# Patient Record
Sex: MALE | Race: WHITE | NOT HISPANIC OR LATINO | Employment: OTHER | URBAN - METROPOLITAN AREA
[De-identification: names, ages, dates, MRNs, and addresses within clinical notes are randomized per-mention and may not be internally consistent; named-entity substitution may affect disease eponyms.]

---

## 2017-02-14 ENCOUNTER — PATIENT MESSAGE (OUTPATIENT)
Dept: OTOLARYNGOLOGY | Facility: CLINIC | Age: 67
End: 2017-02-14

## 2017-03-06 ENCOUNTER — OFFICE VISIT (OUTPATIENT)
Dept: OTOLARYNGOLOGY | Facility: CLINIC | Age: 67
End: 2017-03-06
Payer: MEDICARE

## 2017-03-06 VITALS
WEIGHT: 178 LBS | HEART RATE: 68 BPM | SYSTOLIC BLOOD PRESSURE: 155 MMHG | DIASTOLIC BLOOD PRESSURE: 93 MMHG | BODY MASS INDEX: 24.83 KG/M2

## 2017-03-06 DIAGNOSIS — J32.9 CHRONIC RECURRENT SINUSITIS: Primary | ICD-10-CM

## 2017-03-06 DIAGNOSIS — J34.89 NASAL OBSTRUCTION: ICD-10-CM

## 2017-03-06 DIAGNOSIS — J34.2 NASAL SEPTAL DEVIATION: ICD-10-CM

## 2017-03-06 DIAGNOSIS — J34.3 NASAL TURBINATE HYPERTROPHY: ICD-10-CM

## 2017-03-06 PROCEDURE — 99213 OFFICE O/P EST LOW 20 MIN: CPT | Mod: PBBFAC | Performed by: OTOLARYNGOLOGY

## 2017-03-06 PROCEDURE — 99204 OFFICE O/P NEW MOD 45 MIN: CPT | Mod: S$PBB,,, | Performed by: OTOLARYNGOLOGY

## 2017-03-06 PROCEDURE — 99999 PR PBB SHADOW E&M-EST. PATIENT-LVL III: CPT | Mod: PBBFAC,,, | Performed by: OTOLARYNGOLOGY

## 2017-03-06 NOTE — PROGRESS NOTES
"Subjective:       Patient ID: Sunil Ashley is a 66 y.o. male.    Chief Complaint: Consult (Patient here today with c/o nasal congestion, obstruction, post-nasal drip and facial pain/pressure. Patientt akes OTC meds occasionally or sees his PCP for antibitoics.)    HPI     65 y/o presents with a 20 yr h/o allergy/sinus symptoms that have worsened in past year as well as a h/o a broken nose that occurred when he was in high school. He states that he has sinus infections 4-5 times/year with relief from from Flonase nasal spray and OTC antihistamines with some relief. He presents with postnasal drip, itchy watery eyes, facial pressure, and nasal congestion.     NOSE=35    Review of Systems   Constitutional: Negative for activity change, fatigue, fever and unexpected weight change.   HENT: Positive for congestion, postnasal drip and sinus pressure. Negative for dental problem, ear discharge, ear pain, facial swelling, hearing loss, mouth sores, nosebleeds, rhinorrhea, sneezing, sore throat, tinnitus, trouble swallowing and voice change.         C/o facial pressure.   Eyes: Positive for discharge and itching. Negative for pain and visual disturbance.        C/o "itchy watery eyes."   Respiratory: Negative for cough, choking, chest tightness, shortness of breath, wheezing and stridor.    Cardiovascular: Negative for chest pain.   Gastrointestinal: Negative for nausea and vomiting.   Musculoskeletal: Negative for gait problem, neck pain and neck stiffness.   Skin: Negative for color change, rash and wound.   Allergic/Immunologic: Negative for environmental allergies.   Neurological: Negative for dizziness, seizures, syncope, facial asymmetry, speech difficulty, weakness, light-headedness, numbness and headaches.   Psychiatric/Behavioral: Negative for agitation, confusion and decreased concentration. The patient is not nervous/anxious.        Objective:        Constitutional:   Vital signs are normal. He appears " well-developed and well-nourished. Normal speech.      Head:  Normocephalic.     Ears:  Hearing normal to normal and whispered voice; external ear normal without scars, lesions, or masses; ear canal, tympanic membrane, and middle ear normal..     Nose:  Septal deviation present. No rhinorrhea or sinus tenderness. Turbinate hypertrophy.  Right sinus exhibits no maxillary sinus tenderness and no frontal sinus tenderness. Left sinus exhibits no maxillary sinus tenderness and no frontal sinus tenderness.         80% nasal obstruction noted.    Neck:  Neck normal without thyromegaly masses, asymmetry, normal tracheal structure, crepitus, and tenderness, thyroid normal, trachea normal, phonation normal and full range of motion with neck supple.     Pulmonary/Chest:   Effort normal.     Psychiatric:   He has a normal mood and affect. His speech is normal and behavior is normal.       Assessment:       1. Chronic recurrent sinusitis    2. Nasal septal deviation    3. Nasal turbinate hypertrophy    4. Nasal obstruction        Plan:           OTC Claritin or Zyrtec.  Consider Sinus Med Rinse.  Refer to Allergist.  Schedule CT scan of sinuses.  RTC sooner if symptoms worsen.

## 2017-03-06 NOTE — PROGRESS NOTES
I have personally taken the history and examined this patient and agree with the NP's note that's stated below.

## 2017-03-07 ENCOUNTER — HOSPITAL ENCOUNTER (OUTPATIENT)
Dept: RADIOLOGY | Facility: HOSPITAL | Age: 67
Discharge: HOME | End: 2017-03-07
Attending: OTOLARYNGOLOGY
Payer: MEDICARE

## 2017-03-07 DIAGNOSIS — J32.9 CHRONIC RECURRENT SINUSITIS: ICD-10-CM

## 2017-03-07 DIAGNOSIS — J34.2 NASAL SEPTAL DEVIATION: ICD-10-CM

## 2017-03-07 PROCEDURE — 70486 CT MAXILLOFACIAL W/O DYE: CPT | Mod: TC

## 2017-03-07 PROCEDURE — 70486 CT MAXILLOFACIAL W/O DYE: CPT | Mod: 26,,, | Performed by: RADIOLOGY

## 2017-03-08 ENCOUNTER — PATIENT MESSAGE (OUTPATIENT)
Dept: OTOLARYNGOLOGY | Facility: CLINIC | Age: 67
End: 2017-03-08

## 2017-03-20 ENCOUNTER — PATIENT MESSAGE (OUTPATIENT)
Dept: OTOLARYNGOLOGY | Facility: CLINIC | Age: 67
End: 2017-03-20

## 2017-03-29 ENCOUNTER — OFFICE VISIT (OUTPATIENT)
Dept: OTOLARYNGOLOGY | Facility: CLINIC | Age: 67
End: 2017-03-29
Payer: MEDICARE

## 2017-03-29 DIAGNOSIS — M95.0 NASAL DEFORMITY, ACQUIRED: Primary | ICD-10-CM

## 2017-03-29 DIAGNOSIS — J34.2 NASAL SEPTAL DEVIATION: ICD-10-CM

## 2017-03-29 DIAGNOSIS — J34.89 NASAL OBSTRUCTION: ICD-10-CM

## 2017-03-29 PROCEDURE — 99999 PR PBB SHADOW E&M-EST. PATIENT-LVL II: CPT | Mod: PBBFAC,,, | Performed by: OTOLARYNGOLOGY

## 2017-03-29 PROCEDURE — 99214 OFFICE O/P EST MOD 30 MIN: CPT | Mod: 57,S$PBB,, | Performed by: OTOLARYNGOLOGY

## 2017-03-29 PROCEDURE — 99212 OFFICE O/P EST SF 10 MIN: CPT | Mod: PBBFAC | Performed by: OTOLARYNGOLOGY

## 2017-03-29 NOTE — PROGRESS NOTES
The patient presents to review CT scans. These show no evidence of chronic recurrent sinus infections but do confirm his nasal deformity causing obstruction. We have discussed nasal reconstruction with caudal septal extension graft/septoplasty to correct this. I have disscussed the pros and cons, risks, benefits, alternatives, as well as the technical aspects of the planned procedure in detail. The patient voices understanding and wishes to proceed with scheduling.

## 2017-04-07 DIAGNOSIS — R44.8 FACIAL PRESSURE: ICD-10-CM

## 2017-04-07 DIAGNOSIS — J34.2 NASAL SEPTAL DEFORMITY: ICD-10-CM

## 2017-04-07 DIAGNOSIS — R09.81 NASAL CONGESTION: ICD-10-CM

## 2017-04-07 DIAGNOSIS — J34.89 NASAL OBSTRUCTION: Primary | ICD-10-CM

## 2017-04-07 DIAGNOSIS — R09.82 POST-NASAL DRIP: ICD-10-CM

## 2017-04-07 DIAGNOSIS — R51.9 FACIAL PAIN: ICD-10-CM

## 2017-04-07 DIAGNOSIS — Z01.818 PREOP TESTING: ICD-10-CM

## 2017-04-26 ENCOUNTER — HOSPITAL ENCOUNTER (OUTPATIENT)
Dept: CARDIOLOGY | Facility: CLINIC | Age: 67
Discharge: HOME | End: 2017-04-26
Payer: MEDICARE

## 2017-04-26 DIAGNOSIS — R09.82 POST-NASAL DRIP: ICD-10-CM

## 2017-04-26 DIAGNOSIS — R09.81 NASAL CONGESTION: ICD-10-CM

## 2017-04-26 DIAGNOSIS — J34.2 NASAL SEPTAL DEFORMITY: ICD-10-CM

## 2017-04-26 DIAGNOSIS — R51.9 FACIAL PAIN: ICD-10-CM

## 2017-04-26 DIAGNOSIS — J34.89 NASAL OBSTRUCTION: ICD-10-CM

## 2017-04-26 DIAGNOSIS — R44.8 FACIAL PRESSURE: ICD-10-CM

## 2017-04-26 DIAGNOSIS — Z01.818 PREOP TESTING: ICD-10-CM

## 2017-04-26 PROCEDURE — 93005 ELECTROCARDIOGRAM TRACING: CPT | Mod: PBBFAC | Performed by: INTERNAL MEDICINE

## 2017-04-26 PROCEDURE — 93010 ELECTROCARDIOGRAM REPORT: CPT | Mod: S$PBB,,, | Performed by: INTERNAL MEDICINE

## 2017-05-01 ENCOUNTER — PATIENT MESSAGE (OUTPATIENT)
Dept: OTOLARYNGOLOGY | Facility: CLINIC | Age: 67
End: 2017-05-01

## 2017-05-01 ENCOUNTER — TELEPHONE (OUTPATIENT)
Dept: OTOLARYNGOLOGY | Facility: CLINIC | Age: 67
End: 2017-05-01

## 2017-05-02 ENCOUNTER — ANESTHESIA EVENT (OUTPATIENT)
Dept: SURGERY | Facility: HOSPITAL | Age: 67
End: 2017-05-02
Payer: MEDICARE

## 2017-05-02 ENCOUNTER — HOSPITAL ENCOUNTER (OUTPATIENT)
Facility: HOSPITAL | Age: 67
Discharge: HOME | End: 2017-05-02
Attending: OTOLARYNGOLOGY | Admitting: OTOLARYNGOLOGY
Payer: MEDICARE

## 2017-05-02 ENCOUNTER — SURGERY (OUTPATIENT)
Age: 67
End: 2017-05-02

## 2017-05-02 ENCOUNTER — ANESTHESIA (OUTPATIENT)
Dept: SURGERY | Facility: HOSPITAL | Age: 67
End: 2017-05-02
Payer: MEDICARE

## 2017-05-02 DIAGNOSIS — M95.0 NASAL VALVE COLLAPSE: Primary | ICD-10-CM

## 2017-05-02 PROCEDURE — 30140 RESECT INFERIOR TURBINATE: CPT | Mod: 50,51,GC, | Performed by: OTOLARYNGOLOGY

## 2017-05-02 PROCEDURE — D9220A PRA ANESTHESIA: Mod: CRNA,,, | Performed by: NURSE ANESTHETIST, CERTIFIED REGISTERED

## 2017-05-02 PROCEDURE — 36000706: Performed by: OTOLARYNGOLOGY

## 2017-05-02 PROCEDURE — 25000003 PHARM REV CODE 250: Performed by: OTOLARYNGOLOGY

## 2017-05-02 PROCEDURE — 63600175 PHARM REV CODE 636 W HCPCS: Performed by: NURSE ANESTHETIST, CERTIFIED REGISTERED

## 2017-05-02 PROCEDURE — 37000009 HC ANESTHESIA EA ADD 15 MINS: Performed by: OTOLARYNGOLOGY

## 2017-05-02 PROCEDURE — 94761 N-INVAS EAR/PLS OXIMETRY MLT: CPT

## 2017-05-02 PROCEDURE — 71000033 HC RECOVERY, INTIAL HOUR: Performed by: OTOLARYNGOLOGY

## 2017-05-02 PROCEDURE — 20912 REMOVE CARTILAGE FOR GRAFT: CPT | Mod: 51,GC,, | Performed by: OTOLARYNGOLOGY

## 2017-05-02 PROCEDURE — 71000039 HC RECOVERY, EACH ADD'L HOUR: Performed by: OTOLARYNGOLOGY

## 2017-05-02 PROCEDURE — 36000707: Performed by: OTOLARYNGOLOGY

## 2017-05-02 PROCEDURE — 37000008 HC ANESTHESIA 1ST 15 MINUTES: Performed by: OTOLARYNGOLOGY

## 2017-05-02 PROCEDURE — 25000003 PHARM REV CODE 250: Performed by: ANESTHESIOLOGY

## 2017-05-02 PROCEDURE — 30520 REPAIR OF NASAL SEPTUM: CPT | Mod: 51,GC,, | Performed by: OTOLARYNGOLOGY

## 2017-05-02 PROCEDURE — 71000015 HC POSTOP RECOV 1ST HR: Performed by: OTOLARYNGOLOGY

## 2017-05-02 PROCEDURE — 27000221 HC OXYGEN, UP TO 24 HOURS

## 2017-05-02 PROCEDURE — D9220A PRA ANESTHESIA: Mod: ANES,,, | Performed by: ANESTHESIOLOGY

## 2017-05-02 PROCEDURE — 25000003 PHARM REV CODE 250: Performed by: NURSE ANESTHETIST, CERTIFIED REGISTERED

## 2017-05-02 PROCEDURE — 27201423 OPTIME MED/SURG SUP & DEVICES STERILE SUPPLY: Performed by: OTOLARYNGOLOGY

## 2017-05-02 PROCEDURE — 30465 REPAIR NASAL STENOSIS: CPT | Mod: GC,,, | Performed by: OTOLARYNGOLOGY

## 2017-05-02 RX ORDER — CEPHALEXIN 500 MG/1
500 CAPSULE ORAL EVERY 8 HOURS
Qty: 30 CAPSULE | Refills: 0 | Status: SHIPPED | OUTPATIENT
Start: 2017-05-02 | End: 2017-05-12

## 2017-05-02 RX ORDER — ONDANSETRON 8 MG/1
8 TABLET, ORALLY DISINTEGRATING ORAL EVERY 8 HOURS PRN
Qty: 21 TABLET | Refills: 0 | Status: SHIPPED | OUTPATIENT
Start: 2017-05-02

## 2017-05-02 RX ORDER — HYDROMORPHONE HYDROCHLORIDE 1 MG/ML
0.2 INJECTION, SOLUTION INTRAMUSCULAR; INTRAVENOUS; SUBCUTANEOUS EVERY 5 MIN PRN
Status: DISCONTINUED | OUTPATIENT
Start: 2017-05-02 | End: 2017-05-02 | Stop reason: HOSPADM

## 2017-05-02 RX ORDER — HYDROCODONE BITARTRATE AND ACETAMINOPHEN 7.5; 325 MG/1; MG/1
1 TABLET ORAL EVERY 6 HOURS PRN
Qty: 28 TABLET | Refills: 0 | Status: SHIPPED | OUTPATIENT
Start: 2017-05-02

## 2017-05-02 RX ORDER — ROCURONIUM BROMIDE 10 MG/ML
INJECTION, SOLUTION INTRAVENOUS
Status: DISCONTINUED | OUTPATIENT
Start: 2017-05-02 | End: 2017-05-02

## 2017-05-02 RX ORDER — GLYCOPYRROLATE 0.2 MG/ML
INJECTION INTRAMUSCULAR; INTRAVENOUS
Status: DISCONTINUED | OUTPATIENT
Start: 2017-05-02 | End: 2017-05-02

## 2017-05-02 RX ORDER — CEFAZOLIN SODIUM 1 G/3ML
INJECTION, POWDER, FOR SOLUTION INTRAMUSCULAR; INTRAVENOUS
Status: DISCONTINUED | OUTPATIENT
Start: 2017-05-02 | End: 2017-05-02

## 2017-05-02 RX ORDER — LIDOCAINE HYDROCHLORIDE 10 MG/ML
1 INJECTION, SOLUTION EPIDURAL; INFILTRATION; INTRACAUDAL; PERINEURAL ONCE
Status: COMPLETED | OUTPATIENT
Start: 2017-05-02 | End: 2017-05-02

## 2017-05-02 RX ORDER — NEOSTIGMINE METHYLSULFATE 1 MG/ML
INJECTION, SOLUTION INTRAVENOUS
Status: DISCONTINUED | OUTPATIENT
Start: 2017-05-02 | End: 2017-05-02

## 2017-05-02 RX ORDER — ONDANSETRON 2 MG/ML
INJECTION INTRAMUSCULAR; INTRAVENOUS
Status: DISCONTINUED | OUTPATIENT
Start: 2017-05-02 | End: 2017-05-02

## 2017-05-02 RX ORDER — PHENYLEPHRINE HYDROCHLORIDE 10 MG/ML
INJECTION INTRAVENOUS
Status: DISCONTINUED | OUTPATIENT
Start: 2017-05-02 | End: 2017-05-02

## 2017-05-02 RX ORDER — SODIUM CHLORIDE 9 MG/ML
INJECTION, SOLUTION INTRAVENOUS CONTINUOUS
Status: DISCONTINUED | OUTPATIENT
Start: 2017-05-02 | End: 2017-05-02 | Stop reason: HOSPADM

## 2017-05-02 RX ORDER — MIDAZOLAM HYDROCHLORIDE 1 MG/ML
INJECTION, SOLUTION INTRAMUSCULAR; INTRAVENOUS
Status: DISCONTINUED | OUTPATIENT
Start: 2017-05-02 | End: 2017-05-02

## 2017-05-02 RX ORDER — HYDROCODONE BITARTRATE AND ACETAMINOPHEN 7.5; 325 MG/1; MG/1
1 TABLET ORAL EVERY 6 HOURS PRN
Status: DISCONTINUED | OUTPATIENT
Start: 2017-05-02 | End: 2017-05-02 | Stop reason: HOSPADM

## 2017-05-02 RX ORDER — FENTANYL CITRATE 50 UG/ML
INJECTION, SOLUTION INTRAMUSCULAR; INTRAVENOUS
Status: DISCONTINUED | OUTPATIENT
Start: 2017-05-02 | End: 2017-05-02

## 2017-05-02 RX ORDER — DEXAMETHASONE SODIUM PHOSPHATE 4 MG/ML
INJECTION, SOLUTION INTRA-ARTICULAR; INTRALESIONAL; INTRAMUSCULAR; INTRAVENOUS; SOFT TISSUE
Status: DISCONTINUED | OUTPATIENT
Start: 2017-05-02 | End: 2017-05-02

## 2017-05-02 RX ORDER — LIDOCAINE HYDROCHLORIDE AND EPINEPHRINE 10; 10 MG/ML; UG/ML
INJECTION, SOLUTION INFILTRATION; PERINEURAL
Status: DISCONTINUED | OUTPATIENT
Start: 2017-05-02 | End: 2017-05-02 | Stop reason: HOSPADM

## 2017-05-02 RX ORDER — BUPIVACAINE HYDROCHLORIDE AND EPINEPHRINE 5; 5 MG/ML; UG/ML
INJECTION, SOLUTION EPIDURAL; INTRACAUDAL; PERINEURAL
Status: DISCONTINUED | OUTPATIENT
Start: 2017-05-02 | End: 2017-05-02 | Stop reason: HOSPADM

## 2017-05-02 RX ORDER — OXYMETAZOLINE HCL 0.05 %
SPRAY, NON-AEROSOL (ML) NASAL
Status: DISCONTINUED | OUTPATIENT
Start: 2017-05-02 | End: 2017-05-02 | Stop reason: HOSPADM

## 2017-05-02 RX ORDER — PROPOFOL 10 MG/ML
VIAL (ML) INTRAVENOUS
Status: DISCONTINUED | OUTPATIENT
Start: 2017-05-02 | End: 2017-05-02

## 2017-05-02 RX ORDER — LIDOCAINE HCL/PF 100 MG/5ML
SYRINGE (ML) INTRAVENOUS
Status: DISCONTINUED | OUTPATIENT
Start: 2017-05-02 | End: 2017-05-02

## 2017-05-02 RX ADMIN — MIDAZOLAM HYDROCHLORIDE 2 MG: 1 INJECTION, SOLUTION INTRAMUSCULAR; INTRAVENOUS at 07:05

## 2017-05-02 RX ADMIN — CEFAZOLIN 2 G: 1 INJECTION, POWDER, FOR SOLUTION INTRAVENOUS at 07:05

## 2017-05-02 RX ADMIN — ONDANSETRON 4 MG: 2 INJECTION INTRAMUSCULAR; INTRAVENOUS at 09:05

## 2017-05-02 RX ADMIN — EPHEDRINE SULFATE 10 MG: 50 INJECTION, SOLUTION INTRAMUSCULAR; INTRAVENOUS; SUBCUTANEOUS at 07:05

## 2017-05-02 RX ADMIN — PHENYLEPHRINE HYDROCHLORIDE 100 MCG: 10 INJECTION INTRAVENOUS at 08:05

## 2017-05-02 RX ADMIN — PHENYLEPHRINE HYDROCHLORIDE 100 MCG: 10 INJECTION INTRAVENOUS at 09:05

## 2017-05-02 RX ADMIN — HYDROCODONE BITARTRATE AND ACETAMINOPHEN 1 TABLET: 7.5; 325 TABLET ORAL at 10:05

## 2017-05-02 RX ADMIN — EPHEDRINE SULFATE 10 MG: 50 INJECTION, SOLUTION INTRAMUSCULAR; INTRAVENOUS; SUBCUTANEOUS at 08:05

## 2017-05-02 RX ADMIN — NEOSTIGMINE METHYLSULFATE 5 MG: 1 INJECTION INTRAVENOUS at 10:05

## 2017-05-02 RX ADMIN — LIDOCAINE HYDROCHLORIDE 10 MG: 10 INJECTION, SOLUTION EPIDURAL; INFILTRATION; INTRACAUDAL; PERINEURAL at 06:05

## 2017-05-02 RX ADMIN — BUPIVACAINE HYDROCHLORIDE AND EPINEPHRINE BITARTRATE 16.5 ML: 5; .0091 INJECTION, SOLUTION EPIDURAL; INTRACAUDAL; PERINEURAL at 08:05

## 2017-05-02 RX ADMIN — ROCURONIUM BROMIDE 50 MG: 10 INJECTION, SOLUTION INTRAVENOUS at 07:05

## 2017-05-02 RX ADMIN — FENTANYL CITRATE 100 MCG: 50 INJECTION, SOLUTION INTRAMUSCULAR; INTRAVENOUS at 07:05

## 2017-05-02 RX ADMIN — SODIUM CHLORIDE, SODIUM GLUCONATE, SODIUM ACETATE, POTASSIUM CHLORIDE, MAGNESIUM CHLORIDE, SODIUM PHOSPHATE, DIBASIC, AND POTASSIUM PHOSPHATE: .53; .5; .37; .037; .03; .012; .00082 INJECTION, SOLUTION INTRAVENOUS at 08:05

## 2017-05-02 RX ADMIN — GELATIN ABSORBABLE SPONGE 12-7 MM 1 EACH: 12-7 MISC at 08:05

## 2017-05-02 RX ADMIN — SODIUM CHLORIDE: 0.9 INJECTION, SOLUTION INTRAVENOUS at 06:05

## 2017-05-02 RX ADMIN — DEXAMETHASONE SODIUM PHOSPHATE 12 MG: 4 INJECTION, SOLUTION INTRAMUSCULAR; INTRAVENOUS at 07:05

## 2017-05-02 RX ADMIN — LIDOCAINE HYDROCHLORIDE AND EPINEPHRINE 16.5 ML: 10; 10 INJECTION, SOLUTION INFILTRATION; PERINEURAL at 08:05

## 2017-05-02 RX ADMIN — GLYCOPYRROLATE 0.6 MG: 0.2 INJECTION, SOLUTION INTRAMUSCULAR; INTRAVENOUS at 10:05

## 2017-05-02 RX ADMIN — OXYMETAZOLINE HYDROCHLORIDE 15 ML: 0.05 SPRAY NASAL at 08:05

## 2017-05-02 RX ADMIN — PROPOFOL 200 MG: 10 INJECTION, EMULSION INTRAVENOUS at 07:05

## 2017-05-02 RX ADMIN — LIDOCAINE HYDROCHLORIDE 50 MG: 20 INJECTION, SOLUTION INTRAVENOUS at 07:05

## 2017-05-02 NOTE — TRANSFER OF CARE
Anesthesia Transfer of Care Note    Patient: Sunil Ashley    Procedure(s) Performed: Procedure(s) (LRB):  RECONSTRUCTION-NASAL (Bilateral)  GRAFT- (Bilateral)  SEPTOPLASTY (Bilateral)  RESECTION-TURBINATES (SMR) (Bilateral)    Patient location: PACU    Anesthesia Type: general    Transport from OR: Transported from OR on 6-10 L/min O2 by face mask with adequate spontaneous ventilation    Post pain: adequate analgesia    Post assessment: no apparent anesthetic complications    Post vital signs: stable    Level of consciousness: awake, alert and oriented    Nausea/Vomiting: no nausea/vomiting    Complications: none          Last vitals:   Visit Vitals    /71    Pulse 85    Temp 37.6 °C (99.6 °F) (Axillary)    Resp 18    SpO2 98%

## 2017-05-02 NOTE — BRIEF OP NOTE
[facility]  Brief Operative Note     SUMMARY     Surgery Date: 5/2/2017     Surgeon(s) and Role:     * Viaksh Cheng MD - Resident - Assisting     * Nestor Houser III, MD - Primary        Pre-op Diagnosis:  Facial pain [R51]  Nasal congestion [R09.81]  Nasal obstruction [J34.89]  Post-nasal drip [R09.82]  Preop testing [Z01.818]  Nasal septal deformity [J34.2]  Facial pressure [R68.89]    Post-op Diagnosis:  Post-Op Diagnosis Codes:     * Facial pain [R51]     * Nasal congestion [R09.81]     * Nasal obstruction [J34.89]     * Post-nasal drip [R09.82]     * Preop testing [Z01.818]     * Nasal septal deformity [J34.2]     * Facial pressure [R68.89]    Procedure(s) (LRB):  RECONSTRUCTION-NASAL (Bilateral)  Caudal septal graft  (Bilateral)  SEPTOPLASTY (Bilateral)  RESECTION-TURBINATES (SMR) (Bilateral)    Anesthesia: General    Description of the findings of the procedure: see full operative note     Findings/Key Components: see full operative note     Estimated Blood Loss: 20cc   Specimens:   Specimen     None          Discharge Note    SUMMARY     Admit Date: 5/2/2017    Discharge Date and Time:  05/02/2017 10:28 AM    Hospital Course Following completion of an electively scheduled procedure, the patient was transferred to the PACU for postoperative monitoring. The post operative course was uneventful and noted for adequate pain control and PO intake following surgery. The patient is discharged home in good condition and will follow-up with Dr. Nestor Houser III, MD          Final Diagnosis: Post-Op Diagnosis Codes:     * Facial pain [R51]     * Nasal congestion [R09.81]     * Nasal obstruction [J34.89]     * Post-nasal drip [R09.82]     * Preop testing [Z01.818]     * Nasal septal deformity [J34.2]     * Facial pressure [R68.89]    Disposition: Home or Self Care    Follow Up/Patient Instructions:     Medications:  Reconciled Home Medications:   Current Discharge Medication List       START taking these medications    Details   cephALEXin (KEFLEX) 500 MG capsule Take 1 capsule (500 mg total) by mouth every 8 (eight) hours.  Qty: 30 capsule, Refills: 0      hydrocodone-acetaminophen 7.5-325mg (NORCO) 7.5-325 mg per tablet Take 1 tablet by mouth every 6 (six) hours as needed for Pain.  Qty: 28 tablet, Refills: 0      ondansetron (ZOFRAN-ODT) 8 MG TbDL Take 1 tablet (8 mg total) by mouth every 8 (eight) hours as needed.  Qty: 21 tablet, Refills: 0         STOP taking these medications       omeprazole 20 mg TbEC Comments:   Reason for Stopping:               Discharge Procedure Orders  Diet general     Other restrictions (specify):   Order Comments: No heavy lifting, no nose-blowing, no nose-picking, no abdominal straining x two weeks. Take care to sneeze, where possible, with mouth open. Showers okay, but no baths, no immersion. Mustache dressing may be changed prn saturation and removed when drainage ceases. MD to remove packing, cast, and splints at office visit. Please return to ED should you experience any excessive bleeding, fevers, chills, foul-smelling discharge, or any concerning change in your health.     Call MD for:  temperature >100.4     Call MD for:  persistent nausea and vomiting or diarrhea     Call MD for:  severe uncontrolled pain     Call MD for:  redness, tenderness, or signs of infection (pain, swelling, redness, odor or green/yellow discharge around incision site)     Call MD for:  difficulty breathing or increased cough     Call MD for:  severe persistent headache     Call MD for:  worsening rash     Call MD for:  persistent dizziness, light-headedness, or visual disturbances     Call MD for:  increased confusion or weakness     Leave dressing on - Keep it clean, dry, and intact until clinic visit       Follow-up Information     Follow up with MARK Houser MD. Schedule an appointment as soon as possible for a visit on 5/8/2017.    Specialty:  Otolaryngology    Why:  For  suture removal    Contact information:    [address]  [facility] [address]  [phone]

## 2017-05-02 NOTE — DISCHARGE INSTRUCTIONS
No heavy lifting, no nose-blowing, no nose-picking, no abdominal straining x two weeks. Take care to sneeze, where possible, with mouth open. Showers okay, but no baths, no immersion. Mustache dressing may be changed prn saturation and removed when drainage ceases. MD to remove packing, cast, and splints at office visit. Please return to ED should you experience any excessive bleeding, fevers, chills, foul-smelling discharge, or any concerning change in your health.      Nasal Surgery: Improving the Appearance of Your Nose  Youre scheduled to have nasal surgery. Any surgery to improve the way you look is called aesthetic, plastic, or cosmetic surgery. Aesthetic surgery for the nose is called rhinoplasty or nasal reconstruction. In most of these surgeries, bone and cartilage in the nose are trimmed and moved.     During surgery, bone and cartilage are trimmed to reduce the profile of the nose.   A typical aesthetic problem  What is done during your surgery depends on the type of changes to be made. The nasal bones and the upper and lower cartilage may need to be trimmed. Or they may need to be moved to give your nose the shape you want. Most cuts (incisions) are made inside the nose so the scars will not be seen.  A wide base problem  You may want the base of your nose to be narrowed. To do this, tissue needs to be removed from around the nostrils. Incisions are made on the outside of the nose and some tissue is removed. The tissue is then stitched (sutured) together. The scars will be hidden by the folds of the nostrils.  Risks and possible complications  As with any surgery, nasal surgery has some risks. These include a slight risk of bleeding and infection. Your doctor will discuss any other risks and complications with you.   After aesthetic surgery  After aesthetic surgery, youll be taken to a recovery area or to your hospital room. Your experience may be as follows:  · Youll have gauze bandages or other  material (packing) or a plastic splint inside your nose. This holds the new shape of your nose, prevents bleeding, and promotes healing. Youll also have a dressing (bandages) and a splint on the outside of your nose. These also help the nose keep its shape as it heals.  · Expect mucus and some blood to drain through the dressing.  · You may have some swelling or bruising around your eyes.  · If you have packing, you may have to breathe through your mouth until the packing is removed. This may cause throat dryness and irritation.  · While healing, be careful not to move your dressing or touch your nose.  · Pain medicine will be prescribed as needed. Dont take medicine containing aspirin or ibuprofen. These can increase bleeding.  Follow-up  Youll need to follow up with your doctor within a week after your surgery. Here is what to expect:  · Any packing, splint, or dressings will probably be removed. You may feel slight discomfort and bleed a little when this is done.  · Expect some numbness, swelling, and nasal congestion.  · Any bruising around your eyes and nose will most likely go away within 3 weeks. So will most of the swelling.  · If you are told to do compression exercises to help your nose hold its shape, do so only as directed.  Assessing the results  During later follow-up visits, your doctor will assess your healing and the results of your surgery. It may take 12 to 18 months for your nose to reach its final shape. In most cases, any thick or uneven areas will go away with time. Talk with your doctor about any problems or concerns you may have. In some cases, a second surgery will be needed.  Date Last Reviewed: 11/1/2016 © 2000-2016 [employer]. [address] [place] [address]. All rights reserved. This information is not intended as a substitute for professional medical care. Always follow your healthcare professional's instructions.        Nasal Surgery: Septoplasty  Youre  scheduled to have nasal surgery. The type of nasal surgery youre having is called septoplasty. Read on to learn more about what to expect during this surgery.       During surgery, the surgeon may remove cartilage and bone to reshape the deviated septum. After surgery, there is more breathing space. Enough cartilage and bone remain to give the nose support.     What to expect during septoplasty  This surgery repairs a blockage inside the nose caused by a deviated septum. With a deviated septum, there is a problem with the wall that divides the nose into two chambers. A deviated septum may block air coming through one or both nostrils. This makes it harder for you to breathe through your nose. During septoplasty, the surgeon makes incisions inside the nose. Then the surgeon trims, reshapes, moves, or removes cartilage and sometimes bone from the septum.  Risks and possible complications  As with any surgery, nasal surgery has some risks. These include a slight risk of bleeding and infection. Your doctor will discuss any other risks and complications with you.   After septoplasty  After septoplasty, youll be taken to a recovery area or to your hospital room. Your experience may be as follows:  · You may have packing material inside your nose. This reduces bleeding and promotes healing. You may also have bandages (dressings) on the outside of your nose.  · Its normal to have some mucus and blood drain from your nose. Until packing is removed, you may have to breathe through your mouth.  · You may have some swelling or bruising around the eyelids if a rhinoplasty was also done.  · Expect some throat dryness and irritation.  · Pain medicine will be prescribed as needed. Dont take medicine that contains aspirin or ibuprofen. These can cause increased bleeding.  Follow-up care  Youll need to follow up with your doctor within a week after your surgery. Here is what to expect:  · Any packing, splint, or dressings will  probably be removed. You may feel slight discomfort and bleed a little when this is done.  · After the splint or packing is removed, youll most likely breathe better than you did before surgery.  · You may have minor numbness, pain, swelling, and a little stiffness under the tip of the nose.  · In a few days, the inside of your nose may swell and briefly block your breathing. Or a scab or crust may block breathing for a short time. Leave the scab alone. Your doctor can remove it. Using saline (irrigation or aerosol) regularly after surgery helps to reduce the amount of crusting at each visit.  · Contact your healthcare provider if you have any questions or concerns.  Date Last Reviewed: 11/1/2016 © 2000-2016 [employer]. [address] [place] [address], [place] [address]. All rights reserved. This information is not intended as a substitute for professional medical care. Always follow your healthcare professional's instructions.      Nasal Surgery: Turbinate Surgery  Youre scheduled to have nasal surgery. The type of nasal surgery youre having is called turbinate surgery. Read on to learn more about what to expect during this surgery.     During surgery, bone or mucous membrane may be removed from enlarged turbinates.   What are the turbinates?  The turbinates are located on the inside of each side of your nose. They are curved bony ridges that are lined with mucous membrane. Mucous membrane is thin tissue that also lines the insides of your sinuses and throat. It makes sticky mucus that helps clean the air in the nose of dust and other small particles. The turbinates and mucous membrane warm and moisten the air you breathe through your nose.  What to expect during turbinate surgery  This surgery fixes a blockage caused by enlarged turbinates. The surgeon makes cuts (incisions) inside the nose under the lower turbinate. The surgeon may use a laser to do this. He or she may remove extra bone to make the  turbinate smaller. Sometimes the surgeon also removes excess mucous membrane.  Risks and possible complications  As with any surgery, nasal surgery has some risks. These include a slight risk of bleeding and infection. Your doctor will discuss any other risks and complications with you.   After turbinate surgery  After turbinate surgery, youll be taken to a recovery area or to your hospital room. Your experience may be as follows:  · You may have packing or a plastic splint inside your nose if you had a septoplasty or sinus surgery along with the turbinate surgery.  · You may also have bandages (dressings) on the outside of your nose.  · Its normal to have some mucus and blood drain from your nose. Until packing is removed, you may have to breathe through your mouth.  · Expect some throat dryness and irritation. This is normal after general anesthesia or having a tube down your throat.  · Pain medicine will be prescribed as needed. Dont take medicine containing aspirin or ibuprofen. These can increase bleeding.  Follow-up  Youll need to follow up with your doctor within a week after your surgery. Here is what to expect:  · Any packing, splint, or dressings will probably be removed. You may feel slight discomfort and bleed a little when this is done.  · After the splint or packing is removed, youll most likely breathe better than you did before surgery.  · You may have minor numbness, pain, swelling, and a little stiffness under the tip of the nose.  · In a few days, the inside of your nose may swell and briefly block your breathing. Or a scab or crust may block breathing for a short time. Leave the scab alone. Your doctor can remove it. Using a saline solution in your nose can help reduce and remove crusts.  · Contact your healthcare provider if you have any questions, concerns, or unusual symptoms when you get home.  Date Last Reviewed: 11/1/2016  © 0562-0577 [employer]. [address] [facility],  [place] [address]. All rights reserved. This information is not intended as a substitute for professional medical care. Always follow your healthcare professional's instructions.

## 2017-05-02 NOTE — IP AVS SNAPSHOT
[facility]  [address]  [facility] [address]  Phone: [phone]           Patient Discharge Instructions   Our goal is to set you up for success. This packet includes information on your condition, medications, and your home care.  It will help you care for yourself to prevent having to return to the hospital.     Please ask your nurse if you have any questions.      There are many details to remember when preparing to leave the hospital. Here is what you will need to do:    1. Take your medicine. If you are prescribed medications, review your Medication List on the following pages. You may have new medications to  at the pharmacy and others that you'll need to stop taking. Review the instructions for how and when to take your medications. Talk with your doctor or nurses if you are unsure of what to do.     2. Go to your follow-up appointments. Specific follow-up information is listed in the following pages. Your may be contacted by a nurse or clinical provider about future appointments. Be sure we have all of the phone numbers to reach you. Please contact your provider's office if you are unable to make an appointment.     3. Watch for warning signs. Your doctor or nurse will give you detailed warning signs to watch for and when to call for assistance. These instructions may also include educational information about your condition. If you experience any of warning signs to your health, call your doctor.           [facility]  Unless otherwise directed by your provider, please   contact [facility], our nurse care line   that is available for 24/7 assistance.     [phone] (toll-free)     Registered nurses in the [facility]   provide: appointment scheduling, clinical advisement, health education, and other advisory services.                  ** Verify the list of medication(s) below is accurate and up to date. Carry this with you in case of  emergency. If your medications have changed, please notify your healthcare provider.             Medication List      START taking these medications        Additional Info                      cephALEXin 500 MG capsule   Commonly known as:  KEFLEX   Quantity:  30 capsule   Refills:  0   Dose:  500 mg    Instructions:  Take 1 capsule (500 mg total) by mouth every 8 (eight) hours.     Begin Date    AM    Noon    PM    Bedtime       hydrocodone-acetaminophen 7.5-325mg 7.5-325 mg per tablet   Commonly known as:  NORCO   Quantity:  28 tablet   Refills:  0   Dose:  1 tablet    Last time this was given:  1 tablet on 5/2/2017 10:32 AM   Instructions:  Take 1 tablet by mouth every 6 (six) hours as needed for Pain.     Begin Date    AM    Noon    PM    Bedtime       ondansetron 8 MG Tbdl   Commonly known as:  ZOFRAN-ODT   Quantity:  21 tablet   Refills:  0   Dose:  8 mg    Instructions:  Take 1 tablet (8 mg total) by mouth every 8 (eight) hours as needed.     Begin Date    AM    Noon    PM    Bedtime         ASK your doctor about these medications        Additional Info                      omeprazole 20 mg Tbec   Quantity:  90 each   Refills:  3   Dose:  20 mg    Instructions:  Take 20 mg by mouth before breakfast.     Begin Date    AM    Noon    PM    Bedtime            Where to Get Your Medications      You can get these medications from any pharmacy     Bring a paper prescription for each of these medications     cephALEXin 500 MG capsule    hydrocodone-acetaminophen 7.5-325mg 7.5-325 mg per tablet    ondansetron 8 MG Tbdl                  Please bring to all follow up appointments:    1. A copy of your discharge instructions.  2. All medicines you are currently taking in their original bottles.  3. Identification and insurance card.    Please arrive 15 minutes ahead of scheduled appointment time.    Please call 24 hours in advance if you must reschedule your appointment and/or time.        Your Scheduled Appointments      May 02, 2017 12:45 PM CDT   Medical Photo with PHOTOGRAPHY, APPT   Skip [facility] - Medical Photography ([facility] )    [address]  [facility] [id number] [phone]            May 08, 2017 10:00 AM CDT   Post OP with [place] MD Skip Ledesma III - Otorhinolaryngology ([facility] )    [address]  [facility] [id number] [phone]              Follow-up Information     Follow up with MARK Houser MD. Schedule an appointment as soon as possible for a visit on 5/8/2017.    Specialty:  Otolaryngology    Why:  For suture removal    Contact information:    [address]  [facility] [address]  [phone]          Discharge Instructions     Future Orders    Call MD for:  difficulty breathing or increased cough     Call MD for:  increased confusion or weakness     Call MD for:  persistent dizziness, light-headedness, or visual disturbances     Call MD for:  persistent nausea and vomiting or diarrhea     Call MD for:  redness, tenderness, or signs of infection (pain, swelling, redness, odor or green/yellow discharge around incision site)     Call MD for:  severe persistent headache     Call MD for:  severe uncontrolled pain     Call MD for:  temperature >100.4     Call MD for:  worsening rash     Diet general     Questions:    Total calories:      Fat restriction, if any:      Protein restriction, if any:      Na restriction, if any:      Fluid restriction:      Additional restrictions:      Leave dressing on - Keep it clean, dry, and intact until clinic visit     Other restrictions (specify):     Comments:    No heavy lifting, no nose-blowing, no nose-picking, no abdominal straining x two weeks. Take care to sneeze, where possible, with mouth open. Showers okay, but no baths, no immersion. Mustache dressing may be changed prn saturation and removed when drainage ceases. MD to remove packing, cast, and splints at office visit. Please return to ED should you  experience any excessive bleeding, fevers, chills, foul-smelling discharge, or any concerning change in your health.        Discharge Instructions         No heavy lifting, no nose-blowing, no nose-picking, no abdominal straining x two weeks. Take care to sneeze, where possible, with mouth open. Showers okay, but no baths, no immersion. Mustache dressing may be changed prn saturation and removed when drainage ceases. MD to remove packing, cast, and splints at office visit. Please return to ED should you experience any excessive bleeding, fevers, chills, foul-smelling discharge, or any concerning change in your health.      Nasal Surgery: Improving the Appearance of Your Nose  Youre scheduled to have nasal surgery. Any surgery to improve the way you look is called aesthetic, plastic, or cosmetic surgery. Aesthetic surgery for the nose is called rhinoplasty or nasal reconstruction. In most of these surgeries, bone and cartilage in the nose are trimmed and moved.     During surgery, bone and cartilage are trimmed to reduce the profile of the nose.   A typical aesthetic problem  What is done during your surgery depends on the type of changes to be made. The nasal bones and the upper and lower cartilage may need to be trimmed. Or they may need to be moved to give your nose the shape you want. Most cuts (incisions) are made inside the nose so the scars will not be seen.  A wide base problem  You may want the base of your nose to be narrowed. To do this, tissue needs to be removed from around the nostrils. Incisions are made on the outside of the nose and some tissue is removed. The tissue is then stitched (sutured) together. The scars will be hidden by the folds of the nostrils.  Risks and possible complications  As with any surgery, nasal surgery has some risks. These include a slight risk of bleeding and infection. Your doctor will discuss any other risks and complications with you.   After aesthetic surgery  After  aesthetic surgery, youll be taken to a recovery area or to your hospital room. Your experience may be as follows:  · Youll have gauze bandages or other material (packing) or a plastic splint inside your nose. This holds the new shape of your nose, prevents bleeding, and promotes healing. Youll also have a dressing (bandages) and a splint on the outside of your nose. These also help the nose keep its shape as it heals.  · Expect mucus and some blood to drain through the dressing.  · You may have some swelling or bruising around your eyes.  · If you have packing, you may have to breathe through your mouth until the packing is removed. This may cause throat dryness and irritation.  · While healing, be careful not to move your dressing or touch your nose.  · Pain medicine will be prescribed as needed. Dont take medicine containing aspirin or ibuprofen. These can increase bleeding.  Follow-up  Youll need to follow up with your doctor within a week after your surgery. Here is what to expect:  · Any packing, splint, or dressings will probably be removed. You may feel slight discomfort and bleed a little when this is done.  · Expect some numbness, swelling, and nasal congestion.  · Any bruising around your eyes and nose will most likely go away within 3 weeks. So will most of the swelling.  · If you are told to do compression exercises to help your nose hold its shape, do so only as directed.  Assessing the results  During later follow-up visits, your doctor will assess your healing and the results of your surgery. It may take 12 to 18 months for your nose to reach its final shape. In most cases, any thick or uneven areas will go away with time. Talk with your doctor about any problems or concerns you may have. In some cases, a second surgery will be needed.  Date Last Reviewed: 11/1/2016  © 6022-0735 [employer]. [address] [place] [address], [place] [address]. All rights reserved. This information is not  intended as a substitute for professional medical care. Always follow your healthcare professional's instructions.        Nasal Surgery: Septoplasty  Youre scheduled to have nasal surgery. The type of nasal surgery youre having is called septoplasty. Read on to learn more about what to expect during this surgery.       During surgery, the surgeon may remove cartilage and bone to reshape the deviated septum. After surgery, there is more breathing space. Enough cartilage and bone remain to give the nose support.     What to expect during septoplasty  This surgery repairs a blockage inside the nose caused by a deviated septum. With a deviated septum, there is a problem with the wall that divides the nose into two chambers. A deviated septum may block air coming through one or both nostrils. This makes it harder for you to breathe through your nose. During septoplasty, the surgeon makes incisions inside the nose. Then the surgeon trims, reshapes, moves, or removes cartilage and sometimes bone from the septum.  Risks and possible complications  As with any surgery, nasal surgery has some risks. These include a slight risk of bleeding and infection. Your doctor will discuss any other risks and complications with you.   After septoplasty  After septoplasty, youll be taken to a recovery area or to your hospital room. Your experience may be as follows:  · You may have packing material inside your nose. This reduces bleeding and promotes healing. You may also have bandages (dressings) on the outside of your nose.  · Its normal to have some mucus and blood drain from your nose. Until packing is removed, you may have to breathe through your mouth.  · You may have some swelling or bruising around the eyelids if a rhinoplasty was also done.  · Expect some throat dryness and irritation.  · Pain medicine will be prescribed as needed. Dont take medicine that contains aspirin or ibuprofen. These can cause increased  bleeding.  Follow-up care  Youll need to follow up with your doctor within a week after your surgery. Here is what to expect:  · Any packing, splint, or dressings will probably be removed. You may feel slight discomfort and bleed a little when this is done.  · After the splint or packing is removed, youll most likely breathe better than you did before surgery.  · You may have minor numbness, pain, swelling, and a little stiffness under the tip of the nose.  · In a few days, the inside of your nose may swell and briefly block your breathing. Or a scab or crust may block breathing for a short time. Leave the scab alone. Your doctor can remove it. Using saline (irrigation or aerosol) regularly after surgery helps to reduce the amount of crusting at each visit.  · Contact your healthcare provider if you have any questions or concerns.  Date Last Reviewed: 11/1/2016 © 2000-2016 [employer]. [address] [place] [address]. All rights reserved. This information is not intended as a substitute for professional medical care. Always follow your healthcare professional's instructions.      Nasal Surgery: Turbinate Surgery  Youre scheduled to have nasal surgery. The type of nasal surgery youre having is called turbinate surgery. Read on to learn more about what to expect during this surgery.     During surgery, bone or mucous membrane may be removed from enlarged turbinates.   What are the turbinates?  The turbinates are located on the inside of each side of your nose. They are curved bony ridges that are lined with mucous membrane. Mucous membrane is thin tissue that also lines the insides of your sinuses and throat. It makes sticky mucus that helps clean the air in the nose of dust and other small particles. The turbinates and mucous membrane warm and moisten the air you breathe through your nose.  What to expect during turbinate surgery  This surgery fixes a blockage caused by enlarged  turbinates. The surgeon makes cuts (incisions) inside the nose under the lower turbinate. The surgeon may use a laser to do this. He or she may remove extra bone to make the turbinate smaller. Sometimes the surgeon also removes excess mucous membrane.  Risks and possible complications  As with any surgery, nasal surgery has some risks. These include a slight risk of bleeding and infection. Your doctor will discuss any other risks and complications with you.   After turbinate surgery  After turbinate surgery, youll be taken to a recovery area or to your hospital room. Your experience may be as follows:  · You may have packing or a plastic splint inside your nose if you had a septoplasty or sinus surgery along with the turbinate surgery.  · You may also have bandages (dressings) on the outside of your nose.  · Its normal to have some mucus and blood drain from your nose. Until packing is removed, you may have to breathe through your mouth.  · Expect some throat dryness and irritation. This is normal after general anesthesia or having a tube down your throat.  · Pain medicine will be prescribed as needed. Dont take medicine containing aspirin or ibuprofen. These can increase bleeding.  Follow-up  Youll need to follow up with your doctor within a week after your surgery. Here is what to expect:  · Any packing, splint, or dressings will probably be removed. You may feel slight discomfort and bleed a little when this is done.  · After the splint or packing is removed, youll most likely breathe better than you did before surgery.  · You may have minor numbness, pain, swelling, and a little stiffness under the tip of the nose.  · In a few days, the inside of your nose may swell and briefly block your breathing. Or a scab or crust may block breathing for a short time. Leave the scab alone. Your doctor can remove it. Using a saline solution in your nose can help reduce and remove crusts.  · Contact your healthcare  provider if you have any questions, concerns, or unusual symptoms when you get home.  Date Last Reviewed: 11/1/2016  © 7320-6465 [employer]. [address] [place] [address], [place] [address]. All rights reserved. This information is not intended as a substitute for professional medical care. Always follow your healthcare professional's instructions.              Admission Information     Date & Time Provider Department CSN    5/2/2017  5:55 AM [place] CAMERON Houser III, MD [facility] [id number]      Care Providers     Provider Role Specialty Primary office phone    [place] CAMERON Houser III, MD Attending Provider Otolaryngology [phone]    [place] CAMERON Houser III, MD Surgeon  Otolaryngology [phone]      Your Vitals Were     BP Pulse Temp Resp SpO2       108/59 71 98.4 °F (36.9 °C) (Oral) 18 97%       Recent Lab Values     No lab values to display.      Allergies as of 5/2/2017     No Known Allergies      Advance Directives     An advance directive is a document which, in the event you are no longer able to make decisions for yourself, tells your healthcare team what kind of treatment you do or do not want to receive, or who you would like to make those decisions for you.  If you do not currently have an advance directive, [facility] encourages you to create one.  For more information call:  [phone]), [phone] ([phone]),  or log on to [email or web address].        Language Assistance Services     ATTENTION: Language assistance services are available, free of charge. Please call [phone].      ATENCIÓN: Si habla español, tiene a obrien disposición servicios gratuitos de asistencia lingüística. Llame al [phone].     CHÚ Ý: N?u b?n nói Ti?ng Vi?t, có các d?ch v? h? tr? ngôn ng? mi?n phí dành cho b?n. G?i s? [phone].         [facility] complies with applicable Federal civil rights laws and does not discriminate on the basis of race, color,  national origin, age, disability, or sex.

## 2017-05-02 NOTE — PLAN OF CARE
Discharge instructions reviewed with patient and spouse. Verbalizes understanding. Copies of instructions given to pt.  Spouse states she has prescriptions given to her by Dr. Houser.  Denies pain or nausea. Tolerating PO fluids. Safety maintained.

## 2017-05-02 NOTE — ANESTHESIA POSTPROCEDURE EVALUATION
Anesthesia Post Evaluation    Patient: Sunil Ashley    Procedure(s) Performed: Procedure(s) (LRB):  RECONSTRUCTION-NASAL (Bilateral)  GRAFT- (Bilateral)  SEPTOPLASTY (Bilateral)  RESECTION-TURBINATES (SMR) (Bilateral)    Final Anesthesia Type: general  Patient location during evaluation: PACU  Patient participation: Yes- Able to Participate  Level of consciousness: awake and alert  Post-procedure vital signs: reviewed and stable  Pain management: adequate  Airway patency: patent  PONV status at discharge: No PONV  Anesthetic complications: no      Cardiovascular status: blood pressure returned to baseline  Respiratory status: unassisted, spontaneous ventilation and room air  Hydration status: euvolemic  Follow-up not needed.        Visit Vitals    BP (!) 116/58    Pulse 70    Temp 37.6 °C (99.6 °F) (Axillary)    Resp 18    SpO2 95%       Pain/Netta Score: Pain Assessment Performed: Yes (5/2/2017 11:20 AM)  Presence of Pain: denies (5/2/2017 11:20 AM)  Pain Rating Prior to Med Admin: 4 (5/2/2017 10:32 AM)  Netta Score: 10 (5/2/2017 11:20 AM)

## 2017-05-02 NOTE — H&P
Otolaryngology - Head and Neck Surgery          Chief Complaint: nasal obstruction     History of Present Illness: 66 y/o presents with a 20 yr h/o allergy/sinus symptoms that have worsened in past year as well as a h/o a broken nose that occurred when he was in high school. He states that he has sinus infections 4-5 times/year with relief from from Flonase nasal spray and OTC antihistamines with some relief. He presents with postnasal drip, itchy watery eyes, facial pressure, and nasal congestion.     Review of Systems - Negative except as above     Past Medical History: Patient has a past medical history of Gout; Gout, unspecified; Hyperkalemia; Hypertension (2/19/2013); Left hip pain; and Right hip pain.    Past Surgical History: Patient has a past surgical history that includes Joint replacement; right hip decompression; L thigh mass excised-benigned; Tonsillectomy; and Colonoscopy.    Social History: Patient reports that he has never smoked. He does not have any smokeless tobacco history on file. He reports that he drinks alcohol. He reports that he does not use illicit drugs.    Family History: family history includes Gout in his brother and father; Hypertension in his mother; Osteoarthritis in his mother.    Medications:      Allergies: Patient has No Known Allergies.    Physical Exam: There were no vitals filed for this visit.           Constitutional:   Vital signs are normal. He appears well-developed and well-nourished. Normal speech.      Head:  Normocephalic.      Ears:  Hearing normal to normal and whispered voice; external ear normal without scars, lesions, or masses; ear canal, tympanic membrane, and middle ear normal..      Nose:  Septal deviation present. No rhinorrhea or sinus tenderness. Turbinate hypertrophy. Right sinus exhibits no maxillary sinus tenderness and no frontal sinus tenderness. Left sinus exhibits no maxillary sinus tenderness and no frontal sinus tenderness.         80% nasal  obstruction noted.     Neck:  Neck normal without thyromegaly masses, asymmetry, normal tracheal structure, crepitus, and tenderness, thyroid normal, trachea normal, phonation normal and full range of motion with neck supple.      Pulmonary/Chest:   Effort normal.      Psychiatric:   He has a normal mood and affect. His speech is normal and behavior is normal.     CBC  No results for input(s): WBC, HGB, HCT, MCV, PLT in the last 24 hours.  BMP  No results for input(s): GLU, NA, K, CL, CO2, BUN, CREATININE, CALCIUM, MG in the last 24 hours.    Imaging Results     None           Assessment: 66 yo M with septal deviation, nasal valve collapse     Plan:     To OR   The patient's medications, allergies, past medical, surgical, social and family histories were reviewed and updated as appropriate.

## 2017-05-02 NOTE — ANESTHESIA PREPROCEDURE EVALUATION
05/02/2017  Sunil Ashley is a 67 y.o., male.    Anesthesia Evaluation    I have reviewed the Patient Summary Reports.     I have reviewed the Medications.     Review of Systems  Anesthesia Hx:  No problems with previous Anesthesia    Cardiovascular:   Hypertension    Pulmonary:  Pulmonary Normal        Physical Exam  General:  Well nourished    Airway/Jaw/Neck:  Airway Findings: Mouth Opening: Normal Mallampati: III  TM Distance: Normal, at least 6 cm      Dental:  Dental Findings: In tact   Chest/Lungs:  Chest/Lungs Findings: Normal Respiratory Rate     Heart/Vascular:  Heart Findings: Rate: Normal  Rhythm: Regular Rhythm             Anesthesia Plan  Type of Anesthesia, risks & benefits discussed:  Anesthesia Type:  general  Patient's Preference:   Intra-op Monitoring Plan:   Intra-op Monitoring Plan Comments:   Post Op Pain Control Plan:   Post Op Pain Control Plan Comments:   Induction:   IV  Beta Blocker:  Patient is not currently on a Beta-Blocker (No further documentation required).       Informed Consent: Patient understands risks and agrees with Anesthesia plan.  Questions answered. Anesthesia consent signed with patient.  ASA Score: 2     Day of Surgery Review of History & Physical:    H&P update referred to the surgeon.         Ready For Surgery From Anesthesia Perspective.

## 2017-05-02 NOTE — ANESTHESIA RELEASE NOTE
Anesthesia Release from PACU Note    Patient: Sunil Ashley    Procedure(s) Performed: Procedure(s) (LRB):  RECONSTRUCTION-NASAL (Bilateral)  GRAFT- (Bilateral)  SEPTOPLASTY (Bilateral)  RESECTION-TURBINATES (SMR) (Bilateral)    Anesthesia type: GEN    Post pain: Adequate analgesia reported    Post assessment: no apparent anesthetic complications, tolerated procedure well and no evidence of recall    Post vital signs: BP (!) 116/58  Pulse 70  Temp 37.6 °C (99.6 °F) (Axillary)   Resp 18  SpO2 95%    Level of consciousness: awake, alert and oriented    Nausea/Vomiting: no nausea/no vomiting    Complications: none    Airway Patency: patent    Respiratory: unassisted, spontaneous ventilation, room air    Cardiovascular: stable and blood pressure at baseline    Hydration: euvolemic

## 2017-05-03 VITALS
OXYGEN SATURATION: 96 % | TEMPERATURE: 98 F | RESPIRATION RATE: 20 BRPM | HEART RATE: 74 BPM | DIASTOLIC BLOOD PRESSURE: 60 MMHG | SYSTOLIC BLOOD PRESSURE: 118 MMHG

## 2017-05-03 NOTE — OP NOTE
DATE OF PROCEDURE: 05/02/2017    SURGEON: Nestor Houser III, M.D.     ASSISTANT SURGEON: Vikash Cheng M.D. (RES).     PRE-OPERATIVE DIAGNOSIS:  1. Nasal Septal Deviation   2. Inferior Turbinate Hypertrophy   3. Nasal Tip Ptosis   4. Nasal valve collapse     POST-OPERATIVE DIAGNOSIS:  1. Nasal Septal Deviation   2. Inferior Turbinate Hypertrophy   3. Nasal Tip Ptosis   4. Nasal valve collapse     ANESTHESIA: General endotracheal anesthesia.    MEDS AND IV FLUIDS: Please see Anesthesia's report.     SPECIMENS:   None    ESTIMATED BLOOD LOSS: 20 cc     COMPLICATIONS: None apparent.       PROCEDURES PERFORMED:   1. Nasal reconstruction with caudal extension graft graft CPT 05112   2. Septoplasty, CPT 93575.   3. Submucous resection of the turbinates, CPT 69010-39.   4. Septal Cartilage Graft CPT 69607        INDICATIONS FOR PROCEDURE: Sunil Ashley   is a 67 y.o. male  who presented to outpatient clinic for evaluation of nasal obstruction despite medical therapy. On anterior rhinoscopy, patient was noted to have a deviated nasal septum to right with resultant obstruction. Also, he has a very ptotic tip and no tip support leading to further obstruction of the external nasal valve. The risks, benefits, and alternatives of/to surgery were explained to the patient in detail. Pt expressed understanding, and elected to proceed with the aforementioned procedure.        PROCEDURE IN DETAIL: The patient was identified in pre-operative holding using two patient specific identifiers. The procedure was discussed in detail, including all risks, benefits, and alternatives. All questions were answered to the patient's apparent satisfaction. Verbal and written consent were obtained. The patient was transported to the OR on a stretcher. General endotracheal anesthesia was induced per the anesthesia team without difficult. The head of the bed was rotated 90 degrees. The nasal dorsum, tip, ala, columella, septum, and inferior  turbinates, were injected with 50/50 mixture of lidocaine 1% with epinephrine 1:100,000, 0.5% marcaine, 16.5 cc and the vibrisse were trimmed. Afrin pledgets were placed in the nasal cavities bilaterally. The patient was prepped and draped in the usual sterile fashion.   The patient was noted to have right septal deviation and minimal tip support . An inverted V incision was made in the columella and the skin and soft tissue envelope was raised from the cartilagenous framework of the nose. The mesial crura were  in the midline to expose the caudal septum. Mucoperichondrial flaps were raised from the cartilagenous and bony septum bilaterally.   Once adequate flap elevation had been achieved on both sides, heavy scissors were utilized to make a superior and an inferior cut along the bony part of the septum, and these bony portions were removed with Coleen forceps. A posterior section of cartilage was then removed for proposed caudal extension graft, being sure to preserve the 1.5 cm caudal strut.   Attention was then turned to preparation of the caudal extension graft. . The septal graft which was previously harvested was trimmed appropriately and sutured to the native caudal septum with non-absorbable sutures. This was placed into the previously created medial crura pocket and sutured into position with 4-0 and 5-0 Monocryl on omar needles in simple interrupted fashion making sure to affix the graft, native septum, and medial crura with multiple simple interrupted stitches. This provided much improved tip support and improved his projection as well.   Closure was then begun.The skin and soft tissue envelope was then replaced and the inverted V incision was closed with 5-0 nylon interrupted sutures.. Coapting sutures with 5-0 chromic in quilting fashion was placed to reaproximate the mucoperichondrial flaps.   Next, using the microdebrider, a submucous resection of the turbinates was carried out  bilaterally in usual fashion. Once adequate reduction of each turbinate was obtained, a Pedro elevator was utilized to outfracture the inferior turbinates bilaterally    Septal splints were then inserted and sutured to the septum with an anterior nylon stitch. Next, nasal packing coated in bacitracin was inserted bilaterally and sutured with a 4-0 nylon stitch. At this point, the procedure was deemed complete.   The patient's stomach contents were suctioned with an orogastric tube. The patient was then turned back towards Anesthesia, where he was extubated without incident and brought back to PACU,and in a stable condition.           Dr. Houser was present and scrubbed for the entire case.

## 2017-05-08 ENCOUNTER — OFFICE VISIT (OUTPATIENT)
Dept: OTOLARYNGOLOGY | Facility: CLINIC | Age: 67
End: 2017-05-08
Payer: MEDICARE

## 2017-05-08 VITALS — HEART RATE: 66 BPM | DIASTOLIC BLOOD PRESSURE: 91 MMHG | SYSTOLIC BLOOD PRESSURE: 140 MMHG

## 2017-05-08 DIAGNOSIS — J34.2 NASAL SEPTAL DEVIATION: ICD-10-CM

## 2017-05-08 DIAGNOSIS — J34.3 NASAL TURBINATE HYPERTROPHY: ICD-10-CM

## 2017-05-08 DIAGNOSIS — M95.0 NASAL DEFORMITY, ACQUIRED: Primary | ICD-10-CM

## 2017-05-08 DIAGNOSIS — Z98.890 POST-OPERATIVE STATE: ICD-10-CM

## 2017-05-08 DIAGNOSIS — J34.89 NASAL OBSTRUCTION: ICD-10-CM

## 2017-05-08 PROCEDURE — 99999 PR PBB SHADOW E&M-EST. PATIENT-LVL II: CPT | Mod: PBBFAC,,, | Performed by: OTOLARYNGOLOGY

## 2017-05-08 PROCEDURE — 99212 OFFICE O/P EST SF 10 MIN: CPT | Mod: PBBFAC | Performed by: OTOLARYNGOLOGY

## 2017-05-08 PROCEDURE — 99024 POSTOP FOLLOW-UP VISIT: CPT | Mod: ,,, | Performed by: OTOLARYNGOLOGY

## 2017-05-08 NOTE — PROGRESS NOTES
One week S/P nasal reconstruction with caudal extension graft,septo,turbs.  All packs,splints,and sutures removed.  Healing well,septum and nose straight,airway clear.  Reviewed post op instructions.  RTC June 12th

## 2017-06-12 ENCOUNTER — OFFICE VISIT (OUTPATIENT)
Dept: OTOLARYNGOLOGY | Facility: CLINIC | Age: 67
End: 2017-06-12
Payer: MEDICARE

## 2017-06-12 VITALS — DIASTOLIC BLOOD PRESSURE: 90 MMHG | HEART RATE: 58 BPM | SYSTOLIC BLOOD PRESSURE: 183 MMHG

## 2017-06-12 DIAGNOSIS — M95.0 NASAL DEFORMITY, ACQUIRED: Primary | ICD-10-CM

## 2017-06-12 DIAGNOSIS — J34.89 NASAL OBSTRUCTION: ICD-10-CM

## 2017-06-12 DIAGNOSIS — Z98.890 POST-OPERATIVE STATE: ICD-10-CM

## 2017-06-12 PROCEDURE — 99024 POSTOP FOLLOW-UP VISIT: CPT | Mod: ,,, | Performed by: OTOLARYNGOLOGY

## 2017-06-12 PROCEDURE — 99212 OFFICE O/P EST SF 10 MIN: CPT | Mod: PBBFAC | Performed by: OTOLARYNGOLOGY

## 2017-06-12 PROCEDURE — 99999 PR PBB SHADOW E&M-EST. PATIENT-LVL II: CPT | Mod: PBBFAC,,, | Performed by: OTOLARYNGOLOGY

## 2017-06-12 NOTE — PROGRESS NOTES
Six weeks S/P nasal reconstruction with caudal extension graft,septo,turbs.  Doing well overall.  C/O some obstruction of left nostril.  Exam shows crusting on left anterior septum which I partially removed.  Plan: Increase nasal saline spray/gel.  RTC 2 months or sooner prn.

## 2017-08-14 ENCOUNTER — OFFICE VISIT (OUTPATIENT)
Dept: OTOLARYNGOLOGY | Facility: CLINIC | Age: 67
End: 2017-08-14
Payer: MEDICARE

## 2017-08-14 VITALS — DIASTOLIC BLOOD PRESSURE: 91 MMHG | HEART RATE: 53 BPM | SYSTOLIC BLOOD PRESSURE: 166 MMHG

## 2017-08-14 DIAGNOSIS — J34.2 NASAL SEPTAL DEVIATION: ICD-10-CM

## 2017-08-14 DIAGNOSIS — Z98.890 POST-OPERATIVE STATE: ICD-10-CM

## 2017-08-14 DIAGNOSIS — M95.0 NASAL DEFORMITY, ACQUIRED: Primary | ICD-10-CM

## 2017-08-14 DIAGNOSIS — J34.89 NASAL OBSTRUCTION: ICD-10-CM

## 2017-08-14 PROCEDURE — 99999 PR PBB SHADOW E&M-EST. PATIENT-LVL II: CPT | Mod: PBBFAC,,, | Performed by: OTOLARYNGOLOGY

## 2017-08-14 PROCEDURE — 99212 OFFICE O/P EST SF 10 MIN: CPT | Mod: PBBFAC | Performed by: OTOLARYNGOLOGY

## 2017-08-14 PROCEDURE — 99024 POSTOP FOLLOW-UP VISIT: CPT | Mod: ,,, | Performed by: OTOLARYNGOLOGY

## 2017-08-14 NOTE — PROGRESS NOTES
Three months S/P nasal reconstruction with caudal extension graft,septo,turbs.  Breathing well nasally.  Exam shows septum straight and well supported.  Plan: Cont. nasal saline spray/gel.  RTC 9 months or sooner prn.

## (undated) DEVICE — GAUZE SPONGE 4X4 12PLY

## (undated) DEVICE — SEE MEDLINE ITEM 157194

## (undated) DEVICE — SUT MILD CHROMIC 6-0 C1

## (undated) DEVICE — ELECTRODE REM PLYHSV RETURN 9

## (undated) DEVICE — SUT ETHILON 4-0 PS2 18 BLK

## (undated) DEVICE — SEE MEDLINE ITEM 157128

## (undated) DEVICE — PENCIL ROCKER SWITCH 10FT CORD

## (undated) DEVICE — SUT ETHILON 6-0 BLK P-1 BLK

## (undated) DEVICE — BLADE SURGICAL 15C

## (undated) DEVICE — TRAY ENT 4/CS

## (undated) DEVICE — ELECTRODE NEEDLE 2.8IN

## (undated) DEVICE — SEE MEDLINE ITEM 146372

## (undated) DEVICE — SHEET EENT SPLIT

## (undated) DEVICE — SUT 4/0 18IN PROLENE BL MON

## (undated) DEVICE — SUT 5-0 CHROMIC GUT / P-3

## (undated) DEVICE — NDL HYPO 27G X 1 1/2

## (undated) DEVICE — SPONGE DERMACEA 4X4IN 12PLY

## (undated) DEVICE — SPLINT REUTER BIVALVE 0.5MM

## (undated) DEVICE — SEE MEDLINE ITEM 152622

## (undated) DEVICE — BLADE INFERIOR TURBINATE 5/PK

## (undated) DEVICE — SEE MEDLINE ITEM 152487

## (undated) DEVICE — SUT PROLENE 6-0 P-1 18

## (undated) DEVICE — SEE MEDLINE ITEM 146313

## (undated) DEVICE — DRESSING EYE OVAL LF

## (undated) DEVICE — SEE MEDLINE ITEM 157117

## (undated) DEVICE — SPONGE PATTY SURGICAL .5X3IN

## (undated) DEVICE — SKINMARKER & RULER REGULAR X-F